# Patient Record
(demographics unavailable — no encounter records)

---

## 2025-01-22 NOTE — HISTORY OF PRESENT ILLNESS
[Initial Evaluation] : an initial evaluation of [Cough] : cough [Currently Experiencing] : The patient is currently experiencing symptoms.

## 2025-01-22 NOTE — ASSESSMENT
[FreeTextEntry1] : Some mosaic attenuation on CT chest likely small airway disease HO 35 PY smoking quit 16 ears ago Chronic cough improved.  Likely COPD exacerbation after a URI  Small lung nodules stable on repeat CT